# Patient Record
Sex: MALE | Race: WHITE | NOT HISPANIC OR LATINO | Employment: FULL TIME | URBAN - METROPOLITAN AREA
[De-identification: names, ages, dates, MRNs, and addresses within clinical notes are randomized per-mention and may not be internally consistent; named-entity substitution may affect disease eponyms.]

---

## 2022-10-24 ENCOUNTER — THERAPY VISIT (OUTPATIENT)
Dept: PHYSICAL THERAPY | Facility: CLINIC | Age: 42
End: 2022-10-24
Payer: COMMERCIAL

## 2022-10-24 DIAGNOSIS — M47.816 SPONDYLOSIS OF LUMBAR SPINE: ICD-10-CM

## 2022-10-24 PROCEDURE — 97161 PT EVAL LOW COMPLEX 20 MIN: CPT | Mod: GP | Performed by: PHYSICAL THERAPIST

## 2022-10-24 PROCEDURE — 97112 NEUROMUSCULAR REEDUCATION: CPT | Mod: GP | Performed by: PHYSICAL THERAPIST

## 2022-10-24 PROCEDURE — 97110 THERAPEUTIC EXERCISES: CPT | Mod: GP | Performed by: PHYSICAL THERAPIST

## 2022-10-24 NOTE — PROGRESS NOTES
Physical Therapy Initial Evaluation  Subjective:  The history is provided by the patient. No  was used.   Therapist Generated HPI Evaluation  Problem details: I have had low back pain for about 12 years. No specific injury or accident.   I saw a chiropractor.   On or about 10/2012, I had L3-L4 discectomy.  the left leg went numb went away but still had back pain.  I have been dealing with over the counter medication.  If I do something, I have pain in the back for couple days.  They are thinking about Injections in the low back.  I have never had PT.   .         Type of problem:  Lumbar.    This is a chronic condition.  Condition occurred with:  Insidious onset.  Where condition occurred: for unknown reasons.  Patient reports pain:  Lumbar spine left and lumbar spine right (mostly left).  Pain is described as sharp and aching and is constant (dull achingness all the time, sharp pain occasionally).  Pain radiates to:  Gluteals right and gluteals left (left more than right ). Pain is the same all the time.  Since onset symptoms are gradually improving (less pain ).  Associated with: none  Symptoms are exacerbated by bending, walking and standing (the return from bending )  and relieved by rest (lying down, aleve, ).  Special tests included:  MRI (nerve are being pinched ).  Past treatment: none.   Work activity restrictions: financial anyast, working from home , self restriction   Home/work barriers: house, tuck under,  1 child at 6,  yard work     Patient Health History  Boris Moran being seen for bilateral low back pain, left greater than right .     Problem began: 9/28/2022 (MD appointment).   Problem occurred: chronic   Pain is reported as 3/10 on pain scale.  General health as reported by patient is good.  Pertinent medical history includes: none.   Red flags:  None as reported by patient.  Medical allergies: none.    Other surgery history details: low back srugery, lasik.     Other  medications details: anxiety .    Current occupation is financial anaylst.   Primary job tasks include:  Computer work and prolonged sitting.                                    Objective:  Standing Alignment:    Cervical/Thoracic:  Forward head (poor sitting posture)  Shoulder/UE:  Rounded shoulders (internal rotation of bilateral shoulders)  Lumbar:  Lordosis decr          General Deviations:  Toe out R and toe out L  Gait:  General stiffness with walking     Deviations:  Hip:  Excessive flexion L and excessive flexion R    Flexibility/Screens:   Positive screens:  Lumbar    Lower Extremity:  Decreased left lower extremity flexibility:Hip Flexors; Quadriceps; Hamstrings and Gastroc    Decreased right lower extremity flexibility:  Hip Flexors; Quadriceps; Hamstrings and Gastroc  Spine:  Decreased left spine flexibility:  Quadratus Lumborum    Decreased right spine flexibility:  Quadratus Lumborum             Lumbar/SI Evaluation  ROM:    AROM Lumbar:   Flexion:          Upper thigh poor returning to nuetra;  Ext:                    Moderate movement    Side Bend:        Left:  Moderate movement     Right:  Moderate movement   Rotation:           Left:     Right:   Side Glide:        Left:     Right:         Strength: TA 1/5, decrease in bilateral hip ROM unable to get leg to neutral.   Lumbar Myotomes:    T12-L3 (Hip Flex):  Left: 5-    Right: 5  L2-4 (Quads):  Left:  5    Right:  5  L4 (Ankle DF):  Left:  5    Right:  5  L5 (Great Toe Ext): Left: 5    Right: 5   S1 (Toe Raise):  Left: 5    Right: 5        Neural Tension/Mobility:    Left side:  SLR (tightness) positive.  Left side:Slump (tightness with pulling in the back )  negative.   Right side:   SLR (tightness) positive.  Right side:   Slump (tightness)  negative.   Lumbar Palpation:    Tenderness present at Left:    Quadratus Lumborum; Erector Spinae and Vertebral  Tenderness present at Right: Quadratus Lumborum; Erector Spinae and Vertebral    Lumbar  Provocation:    Left positive with:  Mobility  Left negative with:  PROM hip  Right positive with: Mobility  Right negative with:  PROM hip  Spinal Segmental Conclusions:     Level: Hypo noted at L1, L2, L3, L4 and L5                                                   General     ROS    Assessment/Plan:    Patient is a 42 year old male with lumbar complaints.    Patient has the following significant findings with corresponding treatment plan.                Diagnosis 1:  Low back pain/ spondylosis   Pain -  manual therapy, self management, education and home program  Decreased ROM/flexibility - manual therapy, therapeutic exercise, therapeutic activity and home program  Decreased joint mobility - manual therapy, therapeutic exercise, therapeutic activity and home program  Decreased strength - therapeutic exercise, therapeutic activities and home program  Impaired muscle performance - neuro re-education and home program  Decreased function - therapeutic activities and home program  Impaired posture - neuro re-education, therapeutic activities and home program    Therapy Evaluation Codes:   Cumulative Therapy Evaluation is: Low complexity.    Previous and current functional limitations:  (See Goal Flow Sheet for this information)    Short term and Long term goals: (See Goal Flow Sheet for this information)     Communication ability:  Patient appears to be able to clearly communicate and understand verbal and written communication and follow directions correctly.  Treatment Explanation - The following has been discussed with the patient:   RX ordered/plan of care  This patient would benefit from PT intervention to resume normal activities.   Rehab potential is good.    Frequency:  2 X week, once daily  Duration:  for 6 weeks  Discharge Plan:  Achieve all LTG.  Independent in home treatment program.    Please refer to the daily flowsheet for treatment today, total treatment time and time spent performing 1:1 timed codes.      Recommendation/Plan of care to also add Manual therapy.  Patient would also benefit from a sit to stand desk to allow for changes of position.

## 2022-10-24 NOTE — LETTER
[facility]  [address]  [address]  [place] [address]  [phone]    2022    Re: Boris Moran   :   1980  MRN:  [id number]   REFERRING PHYSICIAN:   Salud KRUEGER [facility]  Date of Initial Evaluation:  10/24/22  Visits:  Rxs Used: 1  Reason for Referral:  Spondylosis of lumbar spine    EVALUATION SUMMARY  Physical Therapy Initial Evaluation  Subjective:  The history is provided by the patient. No  was used.   Therapist Generated HPI Evaluation  Problem details: I have had low back pain for about 12 years. No specific injury or accident.   I saw a chiropractor.   On or about 10/2012, I had L3-L4 discectomy.  the left leg went numb went away but still had back pain.  I have been dealing with over the counter medication.  If I do something, I have pain in the back for couple days.  They are thinking about Injections in the low back.  I have never had PT.   .         Type of problem:  Lumbar.    This is a chronic condition.  Condition occurred with:  Insidious onset.  Where condition occurred: for unknown reasons.  Patient reports pain:  Lumbar spine left and lumbar spine right (mostly left).  Pain is described as sharp and aching and is constant (dull achingness all the time, sharp pain occasionally).  Pain radiates to:  Gluteals right and gluteals left (left more than right ). Pain is the same all the time.  Since onset symptoms are gradually improving (less pain ).  Associated with: none  Symptoms are exacerbated by bending, walking and standing (the return from bending )  and relieved by rest (lying down, aleve, ).  Special tests included:  MRI (nerve are being pinched ).  Past treatment: none.   Work activity restrictions: financial anyast, working from home , self restriction   Home/work barriers: house, tuck under,  1 child at 5,  yard work     Patient  Health History  Boris Moran being seen for bilateral low back pain, left greater than right .   Problem began: 2022 (MD appointment).   Problem occurred: chronic   Pain is reported as 3/10 on pain scale.  General health as reported by patient is good.  Pertinent medical history includes: none.   Red flags:  None as reported by patient.  Re: Boris Moran   :   1980  Medical allergies: none.    Other surgery history details: low back srugery, lasik.     Other medications details: anxiety .    Current occupation is financial anaylst.   Primary job tasks include:  Computer work and prolonged sitting.                Objective:  Standing Alignment:    Cervical/Thoracic:  Forward head (poor sitting posture)  Shoulder/UE:  Rounded shoulders (internal rotation of bilateral shoulders)  Lumbar:  Lordosis decr  General Deviations:  Toe out R and toe out L  Gait:  General stiffness with walking   Deviations:  Hip:  Excessive flexion L and excessive flexion R  Flexibility/Screens:   Positive screens:  Lumbar  Lower Extremity:  Decreased left lower extremity flexibility:Hip Flexors; Quadriceps; Hamstrings and Gastroc  Decreased right lower extremity flexibility:  Hip Flexors; Quadriceps; Hamstrings and Gastroc  Spine:  Decreased left spine flexibility:  Quadratus Lumborum  Decreased right spine flexibility:  Quadratus Lumborum       Lumbar/SI Evaluation  ROM:    AROM Lumbar:   Flexion:          Upper thigh poor returning to nuetra;  Ext:                    Moderate movement    Side Bend:        Left:  Moderate movement     Right:  Moderate movement   Strength: TA 1/5, decrease in bilateral hip ROM unable to get leg to neutral.   Lumbar Myotomes:    T12-L3 (Hip Flex):  Left: 5-    Right: 5  L2-4 (Quads):  Left:  5    Right:  5  L4 (Ankle DF):  Left:  5    Right:  5  L5 (Great Toe Ext): Left: 5    Right: 5   S1 (Toe Raise):  Left: 5    Right: 5    Neural Tension/Mobility:    Left side:  SLR (tightness) positive.  Left  side:Slump (tightness with pulling in the back )  negative.   Right side:   SLR (tightness) positive.  Right side:   Slump (tightness)  negative.   Lumbar Palpation:    Tenderness present at Left:    Quadratus Lumborum; Erector Spinae and Vertebral  Tenderness present at Right: Quadratus Lumborum; Erector Spinae and Vertebral  Lumbar Provocation:    Left positive with:  Mobility  Left negative with:  PROM hip  Right positive with: Mobility  Right negative with:  PROM hip  Spinal Segmental Conclusions:   Level: Hypo noted at L1, L2, L3, L4 and L5    Assessment/Plan:    Patient is a 42 year old male with lumbar complaints.    Patient has the following significant findings with corresponding treatment plan.                Diagnosis 1:  Low back pain/ spondylosis   Pain -  manual therapy, self management, education and home program  Decreased ROM/flexibility - manual therapy, therapeutic exercise, therapeutic activity and home program  Decreased joint mobility - manual therapy, therapeutic exercise, therapeutic activity and home program  Decreased strength - therapeutic exercise, therapeutic activities and home program  Impaired muscle performance - neuro re-education and home program  Decreased function - therapeutic activities and home program  Impaired posture - neuro re-education, therapeutic activities and home program    Therapy Evaluation Codes:   Cumulative Therapy Evaluation is: Low complexity.  Previous and current functional limitations:  (See Goal Flow Sheet for this information)    Short term and Long term goals: (See Goal Flow Sheet for this information)     Communication ability:  Patient appears to be able to clearly communicate and understand verbal and written communication and follow directions correctly.  Treatment Explanation - The following has been discussed with the patient:     RX ordered/plan of care  This patient would benefit from PT intervention to resume normal activities.   Rehab potential  is good.    Frequency:  2 X week, once daily  Duration:  for 6 weeks  Discharge Plan:  Achieve all LTG.  Independent in home treatment program.    Recommendation/Plan of care to also add Manual therapy.  Patient would also benefit from a sit to stand desk to allow for changes of position.       Thank you for your referral.    INQUIRIES  Therapist: Selena Dowd PT  [address] [address]  Phone: [phone]  Fax: [phone]

## 2022-10-31 ENCOUNTER — THERAPY VISIT (OUTPATIENT)
Dept: PHYSICAL THERAPY | Facility: CLINIC | Age: 42
End: 2022-10-31
Payer: COMMERCIAL

## 2022-10-31 DIAGNOSIS — M47.816 SPONDYLOSIS OF LUMBAR SPINE: Primary | ICD-10-CM

## 2022-10-31 PROCEDURE — 97110 THERAPEUTIC EXERCISES: CPT | Mod: GP | Performed by: PHYSICAL THERAPIST

## 2022-10-31 PROCEDURE — 97112 NEUROMUSCULAR REEDUCATION: CPT | Mod: GP | Performed by: PHYSICAL THERAPIST

## 2022-11-07 ENCOUNTER — THERAPY VISIT (OUTPATIENT)
Dept: PHYSICAL THERAPY | Facility: CLINIC | Age: 42
End: 2022-11-07
Payer: COMMERCIAL

## 2022-11-07 DIAGNOSIS — M47.816 SPONDYLOSIS OF LUMBAR SPINE: Primary | ICD-10-CM

## 2022-11-07 PROCEDURE — 97110 THERAPEUTIC EXERCISES: CPT | Mod: GP | Performed by: PHYSICAL THERAPIST

## 2022-11-07 PROCEDURE — 97112 NEUROMUSCULAR REEDUCATION: CPT | Mod: GP | Performed by: PHYSICAL THERAPIST

## 2022-11-07 PROCEDURE — 97530 THERAPEUTIC ACTIVITIES: CPT | Mod: GP | Performed by: PHYSICAL THERAPIST

## 2022-12-05 ENCOUNTER — THERAPY VISIT (OUTPATIENT)
Dept: PHYSICAL THERAPY | Facility: CLINIC | Age: 42
End: 2022-12-05
Payer: COMMERCIAL

## 2022-12-05 DIAGNOSIS — M47.816 SPONDYLOSIS OF LUMBAR SPINE: Primary | ICD-10-CM

## 2022-12-05 PROCEDURE — 97140 MANUAL THERAPY 1/> REGIONS: CPT | Mod: GP | Performed by: PHYSICAL THERAPIST

## 2022-12-05 PROCEDURE — 97112 NEUROMUSCULAR REEDUCATION: CPT | Mod: GP | Performed by: PHYSICAL THERAPIST

## 2022-12-05 PROCEDURE — 97110 THERAPEUTIC EXERCISES: CPT | Mod: GP | Performed by: PHYSICAL THERAPIST

## 2023-02-18 ENCOUNTER — HEALTH MAINTENANCE LETTER (OUTPATIENT)
Age: 43
End: 2023-02-18

## 2023-03-13 PROBLEM — M47.816 SPONDYLOSIS OF LUMBAR SPINE: Status: RESOLVED | Noted: 2022-10-24 | Resolved: 2023-03-13

## 2023-03-13 NOTE — PROGRESS NOTES
Discharge Note    Progress reporting period is from initial evaluation date (please see noted date below) to Dec 5, 2022.  Linked Episodes   Type: Episode: Status: Noted: Resolved: Last update: Updated by:   PHYSICAL THERAPY low back pain 10/24/2022 Active 10/24/2022  12/5/2022  3:13 PM Selena Dowd, MARIE      Comments:       Boris failed to follow up and current status is unknown.  Please see information below for last relevant information on current status.  Patient seen for 4 visits.    SUBJECTIVE  Subjective changes noted by patient:  I week and 1/2 I went to for a bit of the walk and had some soreness,  I am waking up with less pain .  I am able stand  5-10 min.  .  Current pain level is 0/10.     Previous pain level was   .   Changes in function:  Yes (See Goal flowsheet attached for changes in current functional level)  Adverse reaction to treatment or activity: None    OBJECTIVE  Changes noted in objective findings: TROM flexion lower shin, extension moderate end range but it doesn't increase the pain     ASSESSMENT/PLAN  Diagnosis: low back pain, spondylosis   Updated problem list and treatment plan:   Decreased ROM/flexibility - HEP  Decreased function - HEP  Impaired muscle performance - HEP  Impaired posture - HEP  STG/LTGs have been met or progress has been made towards goals:  Yes, please see goal flowsheet for most current information  Assessment of Progress: current status is unknown.    Last current status: Pt is progressing as expected   Self Management Plans:  HEP  I have re-evaluated this patient and find that the nature, scope, duration and intensity of the therapy is appropriate for the medical condition of the patient.  Boris continues to require the following intervention to meet STG and LTG's:  HEP.    Recommendations:  Discharge with current home program.  Patient to follow up with MD as needed.    Please refer to the daily flowsheet for treatment today, total treatment time and time  spent performing 1:1 timed codes.

## 2024-03-16 ENCOUNTER — HEALTH MAINTENANCE LETTER (OUTPATIENT)
Age: 44
End: 2024-03-16

## 2025-03-22 ENCOUNTER — HEALTH MAINTENANCE LETTER (OUTPATIENT)
Age: 45
End: 2025-03-22